# Patient Record
Sex: MALE | Race: WHITE | NOT HISPANIC OR LATINO
[De-identification: names, ages, dates, MRNs, and addresses within clinical notes are randomized per-mention and may not be internally consistent; named-entity substitution may affect disease eponyms.]

---

## 2017-05-21 ENCOUNTER — TRANSCRIPTION ENCOUNTER (OUTPATIENT)
Age: 62
End: 2017-05-21

## 2021-09-12 ENCOUNTER — TRANSCRIPTION ENCOUNTER (OUTPATIENT)
Age: 66
End: 2021-09-12

## 2022-03-01 ENCOUNTER — TRANSCRIPTION ENCOUNTER (OUTPATIENT)
Age: 67
End: 2022-03-01

## 2022-03-23 PROBLEM — Z00.00 ENCOUNTER FOR PREVENTIVE HEALTH EXAMINATION: Status: ACTIVE | Noted: 2022-03-23

## 2022-03-24 ENCOUNTER — NON-APPOINTMENT (OUTPATIENT)
Age: 67
End: 2022-03-24

## 2022-03-25 ENCOUNTER — APPOINTMENT (OUTPATIENT)
Dept: UROLOGY | Facility: CLINIC | Age: 67
End: 2022-03-25
Payer: COMMERCIAL

## 2022-03-25 ENCOUNTER — NON-APPOINTMENT (OUTPATIENT)
Age: 67
End: 2022-03-25

## 2022-03-25 VITALS
WEIGHT: 175 LBS | DIASTOLIC BLOOD PRESSURE: 85 MMHG | HEIGHT: 71 IN | TEMPERATURE: 97.3 F | BODY MASS INDEX: 24.5 KG/M2 | HEART RATE: 77 BPM | SYSTOLIC BLOOD PRESSURE: 122 MMHG

## 2022-03-25 DIAGNOSIS — N50.89 OTHER SPECIFIED DISORDERS OF THE MALE GENITAL ORGANS: ICD-10-CM

## 2022-03-25 LAB
BILIRUB UR QL STRIP: NORMAL
CLARITY UR: CLEAR
COLLECTION METHOD: NORMAL
GLUCOSE UR-MCNC: NORMAL
HCG UR QL: 0.2 EU/DL
HGB UR QL STRIP.AUTO: NORMAL
KETONES UR-MCNC: NORMAL
LEUKOCYTE ESTERASE UR QL STRIP: NORMAL
NITRITE UR QL STRIP: NORMAL
PH UR STRIP: 7
PROT UR STRIP-MCNC: NORMAL
SP GR UR STRIP: 1.01

## 2022-03-25 PROCEDURE — 81003 URINALYSIS AUTO W/O SCOPE: CPT | Mod: QW

## 2022-03-25 PROCEDURE — 99204 OFFICE O/P NEW MOD 45 MIN: CPT

## 2022-03-25 PROCEDURE — 76857 US EXAM PELVIC LIMITED: CPT

## 2022-03-25 NOTE — ASSESSMENT
[FreeTextEntry1] : We discussed the patient's marked lower urinary tract symptoms in the face of mild prostatic enlargement and a normal post void residual bladder volume.  We also discussed the trace white blood cells in the urinalysis.  I recommended and ordered a urine culture and cytology which was sent today and I recommended that he return in 2 weeks with a full bladder to perform uroflowmetry and PVR evaluation at the same time we will review the results of the tests done today to help determine the reason for his marked urinary symptoms he may require cystoscopy and urodynamics testing to completely elucidate the etiology of his marked lower urinary tract symptoms.\par \par As for the abnormal scrotal finding of the mass in the area of the left cauda epididymis, we will perform scrotal ultrasound in the office on his next visit to further elucidate whether this mass in any way impinges upon the testicle.  We will also evaluate the bilateral hydroceles with a scrotal ultrasound.  I reviewed the indications risks alternatives and chance for success with this approach and he is in agreement.  Sia Raza MD\par

## 2022-03-25 NOTE — PHYSICAL EXAM
[General Appearance - Well Developed] : well developed [General Appearance - Well Nourished] : well nourished [Normal Appearance] : normal appearance [Well Groomed] : well groomed [General Appearance - In No Acute Distress] : no acute distress [Edema] : no peripheral edema [Respiration, Rhythm And Depth] : normal respiratory rhythm and effort [Exaggerated Use Of Accessory Muscles For Inspiration] : no accessory muscle use [Abdomen Soft] : soft [Abdomen Tenderness] : non-tender [Costovertebral Angle Tenderness] : no ~M costovertebral angle tenderness [Urethral Meatus] : meatus normal [Penis Abnormality] : normal circumcised penis [Scrotum] : the scrotum was normal [Testes Tenderness] : no tenderness of the testes [Prostate Tenderness] : the prostate was not tender [No Prostate Nodules] : no prostate nodules [Prostate Size ___ gm] : prostate size [unfilled] gm [FreeTextEntry1] : Bilateral hydroceles L>R, 2 cm mass in the area of the left cauda epididymis which appears to be separate from but abutting the lower pole of the left testis and is non tender.  [Normal Station and Gait] : the gait and station were normal for the patient's age [No Focal Deficits] : no focal deficits [] : no rash [Oriented To Time, Place, And Person] : oriented to person, place, and time [Affect] : the affect was normal [Mood] : the mood was normal [Not Anxious] : not anxious

## 2022-03-25 NOTE — HISTORY OF PRESENT ILLNESS
[FreeTextEntry1] : 57 YO M seen TODAY 3/25/2022 as NPT for urinary issues. He was seen in 2005 ([facility]) for fertility issues.

## 2022-03-25 NOTE — HISTORY OF PRESENT ILLNESS
[FreeTextEntry1] : 57 YO M seen TODAY 3/25/2022 as NPT for urinary issues. He was seen in 2005 ([facility]) for fertility issues and told me that he now has a 17 YO son. He has noted progressive LUTS over the last 5 years which are severe at present. e has urgency, frequency, nocturia x 4 - 5, but no dysuria or gross hematuria. He was evaluated some years ago but without help. He has tried Flomax in the past but developed blurry vision and stopped this medication. \par UA trace WBC\par IPSS 35/35\par YEFRI 1/25\par VINAY 4/10\par PSA with PCP Eber 1.57 11/15/2021.\par Bladder US:\par PVR 34 cc\par Prostate 42 gm.\par \par Patient is on medication for HTN, cholesterol and gout. NKMA.    The patient denies fevers, chills, nausea and or vomiting and no unexplained weight loss. \par All pertinent parts of the patient PFSH (past medical, family and social histories), laboratory, radiological studies and physician notes were reviewed prior to starting the face to face portion of the  visit. Questionnaire results were discussed with patient.\par

## 2022-03-25 NOTE — LETTER BODY
[Dear  ___] : Dear ~PRIYANKA, [Consult Letter:] : I had the pleasure of evaluating your patient, [unfilled]. [Please see my note below.] : Please see my note below. [Consult Closing:] : Thank you very much for allowing me to participate in the care of this patient.  If you have any questions, please do not hesitate to contact me. [FreeTextEntry2] : Sang Velázquez MD [FreeTextEntry3] : Best personal regards,\par \par Sia\par

## 2022-03-30 LAB
BACTERIA UR CULT: NORMAL
URINE CYTOLOGY: NORMAL

## 2022-04-07 ENCOUNTER — APPOINTMENT (OUTPATIENT)
Dept: UROLOGY | Facility: CLINIC | Age: 67
End: 2022-04-07

## 2022-04-07 NOTE — HISTORY OF PRESENT ILLNESS
[FreeTextEntry1] : 55 YO M seen  3/25/2022 as NPT for urinary issues. He was seen in 2005 ([facility]) for fertility issues and told me that he now has a 15 YO son. He has noted progressive LUTS over the last 5 years which are severe at present. e has urgency, frequency, nocturia x 4 - 5, but no dysuria or gross hematuria. He was evaluated some years ago but without help. He has tried Flomax in the past but developed blurry vision and stopped this medication. \par UA trace WBC\par IPSS 35/35\par YEFRI 1/25\par VINAY 4/10\par PSA with PCP E[facility] 1.57 11/15/2021.\par Bladder US:\par PVR 34 cc\par Prostate 42 gm.\par We discussed the patient's marked lower urinary tract symptoms in the face of mild prostatic enlargement and a normal post void residual bladder volume. We also discussed the trace white blood cells in the urinalysis. I recommended and ordered a urine culture and cytology which was sent today and I recommended that he return in 2 weeks with a full bladder to perform uroflowmetry and PVR evaluation at the same time we will review the results of the tests done today to help determine the reason for his marked urinary symptoms he may require cystoscopy and urodynamics testing to completely elucidate the etiology of his marked lower urinary tract symptoms.\par As for the abnormal scrotal finding of the mass in the area of the left cauda epididymis, we will perform scrotal ultrasound in the office on his next visit to further elucidate whether this mass in any way impinges upon the testicle. We will also evaluate the bilateral hydroceles with a scrotal ultrasound. I reviewed the indications risks alternatives and chance for success with this approach and he is in agreement. Sia Raza MD\par C+S negative\par Cytology negative\par \par Patient seen TODAY 4/7/2022 for URoflow, PVR, and scrotal US. \par \par \par Patient is on medication for HTN, cholesterol and gout. NKMA.    The patient denies fevers, chills, nausea and or vomiting and no unexplained weight loss. \par All pertinent parts of the patient PFSH (past medical, family and social histories), laboratory, radiological studies and physician notes were reviewed prior to starting the face to face portion of the  visit. Questionnaire results were discussed with patient.\par

## 2022-04-12 ENCOUNTER — APPOINTMENT (OUTPATIENT)
Dept: UROLOGY | Facility: CLINIC | Age: 67
End: 2022-04-12
Payer: COMMERCIAL

## 2022-04-12 VITALS — SYSTOLIC BLOOD PRESSURE: 118 MMHG | DIASTOLIC BLOOD PRESSURE: 83 MMHG | TEMPERATURE: 97.3 F | HEART RATE: 67 BPM

## 2022-04-12 DIAGNOSIS — N43.3 HYDROCELE, UNSPECIFIED: ICD-10-CM

## 2022-04-12 LAB
BILIRUB UR QL STRIP: NORMAL
CLARITY UR: CLEAR
COLLECTION METHOD: NORMAL
GLUCOSE UR-MCNC: NORMAL
HCG UR QL: 0.2 EU/DL
HGB UR QL STRIP.AUTO: NORMAL
KETONES UR-MCNC: NORMAL
LEUKOCYTE ESTERASE UR QL STRIP: NORMAL
NITRITE UR QL STRIP: NORMAL
PH UR STRIP: 6
PROT UR STRIP-MCNC: NORMAL
SP GR UR STRIP: 1.01

## 2022-04-12 PROCEDURE — 76870 US EXAM SCROTUM: CPT

## 2022-04-12 PROCEDURE — 81003 URINALYSIS AUTO W/O SCOPE: CPT | Mod: QW

## 2022-04-12 PROCEDURE — 99213 OFFICE O/P EST LOW 20 MIN: CPT

## 2022-04-12 PROCEDURE — 51741 ELECTRO-UROFLOWMETRY FIRST: CPT

## 2022-04-12 NOTE — LETTER BODY
[Dear  ___] : Dear ~PRIYANKA, [Courtesy Letter:] : I had the pleasure of seeing your patient, [unfilled], in my office today. [Please see my note below.] : Please see my note below. [Consult Closing:] : Thank you very much for allowing me to participate in the care of this patient.  If you have any questions, please do not hesitate to contact me. [FreeTextEntry2] : Sang Velázquez [FreeTextEntry3] : Best personal regards,\par \par Sia\par

## 2022-04-12 NOTE — ASSESSMENT
[FreeTextEntry1] : We discussed the patient's mild and intermittent lower urinary tract symptoms in the face of normal uroflowmetry normal PVR negative urine culture and cytology.  I discussed empiric therapy with another alpha blocking agent and we will try him alfuzosin 10 mg daily I ordered this for 6 months and asked him to follow-up with me in 3 months for a repeat uroflowmetry and PVR determination.  I reviewed the use of alfuzosin along with the indications risks alternatives and chances for success.  We also reviewed the potential side effect profile which is similar but less severe than with tamsulosin.  Patient is in agreement with this approach and will try the medicine\par \par As for his scrotal findings of bilateral small spermatoceles and hydroceles, no intervention is necessary at this time we will continue to follow and repeat ultrasound in the future if any change occurs. Sia Raza MD\par

## 2022-04-12 NOTE — HISTORY OF PRESENT ILLNESS
[FreeTextEntry1] : 65 YO M seen  3/25/2022 as NPT for urinary issues. He was seen in 2005 ([facility]) for fertility issues and told me that he now has a 17 YO son. He has noted progressive LUTS over the last 5 years which are severe at present. e has urgency, frequency, nocturia x 4 - 5, but no dysuria or gross hematuria. He was evaluated some years ago but without help. He has tried Flomax in the past but developed blurry vision and stopped this medication. \par UA trace WBC\par IPSS 35/35\par YEFRI 1/25\par VINAY 4/10\par PSA with PCP E[facility] 1.57 11/15/2021.\par Bladder US:\par PVR 34 cc\par Prostate 42 gm.\par We discussed the patient's marked lower urinary tract symptoms in the face of mild prostatic enlargement and a normal post void residual bladder volume. We also discussed the trace white blood cells in the urinalysis. I recommended and ordered a urine culture and cytology which was sent today and I recommended that he return in 2 weeks with a full bladder to perform uroflowmetry and PVR evaluation at the same time we will review the results of the tests done today to help determine the reason for his marked urinary symptoms he may require cystoscopy and urodynamics testing to completely elucidate the etiology of his marked lower urinary tract symptoms.\par As for the abnormal scrotal finding of the mass in the area of the left cauda epididymis, we will perform scrotal ultrasound in the office on his next visit to further elucidate whether this mass in any way impinges upon the testicle. We will also evaluate the bilateral hydroceles with a scrotal ultrasound. I reviewed the indications risks alternatives and chance for success with this approach and he is in agreement. Sia Raza MD\par C+S negative\par Cytology negative\par \par Patient seen TODAY 4/12/2022 for URoflow, PVR, and scrotal US. He told me that he continues to have intermittent LUTS with hesitancy and a weak stream. He tried Flomax in the past but had side effects. He is interested in tryig another medication.\par UA negative\par Uroflow:\par Vol 147 cc\par V Max 13.6 cc/s\par V Ave 7.3 cc/s\par \par Scrotal US:Impression: \par 1.Bilateral small epididymal head cyst visualized.\par 2.Bilateral simple hydrocele visualized measuring 2.9 x 1.9 cm in Right side and 4.2 x 2.2 cm in left side.\par 3.Normal vascular supply in both testicle.\par \par \par \par Patient is on medication for HTN, cholesterol and gout. NKMA.    The patient denies fevers, chills, nausea and or vomiting and no unexplained weight loss. \par All pertinent parts of the patient PFSH (past medical, family and social histories), laboratory, radiological studies and physician notes were reviewed prior to starting the face to face portion of the  visit. Questionnaire results were discussed with patient.\par

## 2022-04-18 RX ORDER — ALFUZOSIN HYDROCHLORIDE 10 MG/1
10 TABLET, EXTENDED RELEASE ORAL DAILY
Qty: 30 | Refills: 0 | Status: ACTIVE | COMMUNITY
Start: 2022-04-15 | End: 1900-01-01

## 2022-07-21 ENCOUNTER — APPOINTMENT (OUTPATIENT)
Dept: UROLOGY | Facility: CLINIC | Age: 67
End: 2022-07-21

## 2022-07-21 VITALS
BODY MASS INDEX: 24.5 KG/M2 | TEMPERATURE: 98 F | DIASTOLIC BLOOD PRESSURE: 72 MMHG | WEIGHT: 175 LBS | HEIGHT: 71 IN | HEART RATE: 67 BPM | RESPIRATION RATE: 18 BRPM | SYSTOLIC BLOOD PRESSURE: 105 MMHG

## 2022-07-21 PROCEDURE — 99213 OFFICE O/P EST LOW 20 MIN: CPT

## 2022-07-21 PROCEDURE — 81003 URINALYSIS AUTO W/O SCOPE: CPT | Mod: QW

## 2022-07-21 PROCEDURE — 51798 US URINE CAPACITY MEASURE: CPT

## 2022-07-21 NOTE — ASSESSMENT
[FreeTextEntry1] : We discussed the patient's urinary symptoms in the face of a minimal residual bladder volume and I recommended full urodynamics testing in order to further delineate the etiology of the symptoms.  As to the terminal dribbling and mild incontinence, he may also require cystoscopy after the urodynamics testing.  I reviewed the indications risks alternatives and chance for success with this approach and the patient is amenable to proceeding.  Urodynamics was scheduled for him.\par \par As for the alfuzosin, it does help his symptoms I recommend he continue on the medication.  This was renewed for him for a year. Sia Raza MD

## 2022-07-21 NOTE — LETTER BODY
[Dear  ___] : Dear ~PRIYANKA, [Courtesy Letter:] : I had the pleasure of seeing your patient, [unfilled], in my office today. [Please see my note below.] : Please see my note below. [Consult Closing:] : Thank you very much for allowing me to participate in the care of this patient.  If you have any questions, please do not hesitate to contact me. [FreeTextEntry2] : Sang Velázquez MD [FreeTextEntry3] : Best personal regards,\par \par Sia\par

## 2022-07-21 NOTE — HISTORY OF PRESENT ILLNESS
[FreeTextEntry1] : 65 YO M seen  3/25/2022 as NPT for urinary issues. He was seen in 2005 ([facility]) for fertility issues and told me that he now has a 15 YO son. He has noted progressive LUTS over the last 5 years which are severe at present. e has urgency, frequency, nocturia x 4 - 5, but no dysuria or gross hematuria. He was evaluated some years ago but without help. He has tried Flomax in the past but developed blurry vision and stopped this medication. \par UA trace WBC\par IPSS 35/35\par YEFRI 1/25\par VINAY 4/10\par PSA with PCP E[facility] 1.57 11/15/2021.\par Bladder US:\par PVR 34 cc\par Prostate 42 gm.\par We discussed the patient's marked lower urinary tract symptoms in the face of mild prostatic enlargement and a normal post void residual bladder volume. We also discussed the trace white blood cells in the urinalysis. I recommended and ordered a urine culture and cytology which was sent today and I recommended that he return in 2 weeks with a full bladder to perform uroflowmetry and PVR evaluation at the same time we will review the results of the tests done today to help determine the reason for his marked urinary symptoms he may require cystoscopy and urodynamics testing to completely elucidate the etiology of his marked lower urinary tract symptoms.\par As for the abnormal scrotal finding of the mass in the area of the left cauda epididymis, we will perform scrotal ultrasound in the office on his next visit to further elucidate whether this mass in any way impinges upon the testicle. We will also evaluate the bilateral hydroceles with a scrotal ultrasound. I reviewed the indications risks alternatives and chance for success with this approach and he is in agreement. Sia Raza MD\par C+S negative\par Cytology negative\par \par Patient seen  4/12/2022 for URoflow, PVR, and scrotal US. He told me that he continues to have intermittent LUTS with hesitancy and a weak stream. He tried Flomax in the past but had side effects. He is interested in tryig another medication.\par UA negative\par Uroflow:\par Vol 147 cc\par V Max 13.6 cc/s\par V Ave 7.3 cc/s\par \par Scrotal US:Impression: \par 1.Bilateral small epididymal head cyst visualized.\par 2.Bilateral simple hydrocele visualized measuring 2.9 x 1.9 cm in Right side and 4.2 x 2.2 cm in left side.\par 3.Normal vascular supply in both testicle.\par We discussed the patient's mild and intermittent lower urinary tract symptoms in the face of normal uroflowmetry normal PVR negative urine culture and cytology. I discussed empiric therapy with another alpha blocking agent and we will try him alfuzosin 10 mg daily I ordered this for 6 months and asked him to follow-up with me in 3 months for a repeat uroflowmetry and PVR determination. I reviewed the use of alfuzosin along with the indications risks alternatives and chances for success. We also reviewed the potential side effect profile which is similar but less severe than with tamsulosin. Patient is in agreement with this approach and will try the medicine\par As for his scrotal findings of bilateral small spermatoceles and hydroceles, no intervention is necessary at this time we will continue to follow and repeat ultrasound in the future if any change occurs.\par \par Patient seen TODAY 7/21/2022 to assess his response to the alfuzosin with repeat Uroflow and PVR. He told me that while his urination is a little better on the alfuzosin, he does note that if he misses a dose, his symptoms are significantly worse, and he requests a refill. He also notes the common loss of urine from the penis after urination, typically unconsciously, which causes him to wet his underwear, and is a significant annoyance. He can usually sleep through the night until about 5 AM, then urinates 2 - 3 times over the next 1 -2  hours. \par UA negative\par IPSS 24\par URoflow inaccurate due to 45 cc Volume\par PV 2 cc. \par \par Patient is on medication for HTN, cholesterol and gout. NKMA.    The patient denies fevers, chills, nausea and or vomiting and no unexplained weight loss. \par All pertinent parts of the patient PFSH (past medical, family and social histories), laboratory, radiological studies and physician notes were reviewed prior to starting the face to face portion of the  visit. Questionnaire results were discussed with patient.\par

## 2022-08-11 ENCOUNTER — APPOINTMENT (OUTPATIENT)
Dept: UROLOGY | Facility: CLINIC | Age: 67
End: 2022-08-11

## 2022-08-25 ENCOUNTER — APPOINTMENT (OUTPATIENT)
Dept: UROLOGY | Facility: CLINIC | Age: 67
End: 2022-08-25

## 2022-08-25 VITALS
HEART RATE: 73 BPM | OXYGEN SATURATION: 98 % | TEMPERATURE: 98 F | WEIGHT: 175 LBS | DIASTOLIC BLOOD PRESSURE: 83 MMHG | HEIGHT: 71 IN | SYSTOLIC BLOOD PRESSURE: 118 MMHG | BODY MASS INDEX: 24.5 KG/M2 | RESPIRATION RATE: 18 BRPM

## 2022-08-25 LAB
BILIRUB UR QL STRIP: NORMAL
CLARITY UR: CLEAR
COLLECTION METHOD: NORMAL
GLUCOSE UR-MCNC: NORMAL
HCG UR QL: 0.2 EU/DL
HGB UR QL STRIP.AUTO: NORMAL
KETONES UR-MCNC: NORMAL
LEUKOCYTE ESTERASE UR QL STRIP: NORMAL
NITRITE UR QL STRIP: NORMAL
PH UR STRIP: 6
PROT UR STRIP-MCNC: NORMAL
SP GR UR STRIP: 1.02

## 2022-08-25 PROCEDURE — 51728 CYSTOMETROGRAM W/VP: CPT

## 2022-08-25 PROCEDURE — 51798 US URINE CAPACITY MEASURE: CPT

## 2022-08-25 PROCEDURE — 51784 ANAL/URINARY MUSCLE STUDY: CPT

## 2022-08-25 PROCEDURE — 51741 ELECTRO-UROFLOWMETRY FIRST: CPT

## 2022-08-25 PROCEDURE — 81003 URINALYSIS AUTO W/O SCOPE: CPT | Mod: QW

## 2022-08-25 PROCEDURE — 51797 INTRAABDOMINAL PRESSURE TEST: CPT

## 2022-08-25 NOTE — HISTORY OF PRESENT ILLNESS
[FreeTextEntry1] : 65 YO M seen  3/25/2022 as NPT for urinary issues. He was seen in 2005 ([facility]) for fertility issues and told me that he now has a 15 YO son. He has noted progressive LUTS over the last 5 years which are severe at present. e has urgency, frequency, nocturia x 4 - 5, but no dysuria or gross hematuria. He was evaluated some years ago but without help. He has tried Flomax in the past but developed blurry vision and stopped this medication. \par UA trace WBC\par IPSS 35/35\par YEFRI 1/25\par VINAY 4/10\par PSA with PCP E[facility] 1.57 11/15/2021.\par Bladder US:\par PVR 34 cc\par Prostate 42 gm.\par We discussed the patient's marked lower urinary tract symptoms in the face of mild prostatic enlargement and a normal post void residual bladder volume. We also discussed the trace white blood cells in the urinalysis. I recommended and ordered a urine culture and cytology which was sent today and I recommended that he return in 2 weeks with a full bladder to perform uroflowmetry and PVR evaluation at the same time we will review the results of the tests done today to help determine the reason for his marked urinary symptoms he may require cystoscopy and urodynamics testing to completely elucidate the etiology of his marked lower urinary tract symptoms.\par As for the abnormal scrotal finding of the mass in the area of the left cauda epididymis, we will perform scrotal ultrasound in the office on his next visit to further elucidate whether this mass in any way impinges upon the testicle. We will also evaluate the bilateral hydroceles with a scrotal ultrasound. I reviewed the indications risks alternatives and chance for success with this approach and he is in agreement. Sia Raza MD\par C+S negative\par Cytology negative\par \par Patient seen  4/12/2022 for URoflow, PVR, and scrotal US. He told me that he continues to have intermittent LUTS with hesitancy and a weak stream. He tried Flomax in the past but had side effects. He is interested in tryig another medication.\par UA negative\par Uroflow:\par Vol 147 cc\par V Max 13.6 cc/s\par V Ave 7.3 cc/s\par \par Scrotal US:Impression: \par 1.Bilateral small epididymal head cyst visualized.\par 2.Bilateral simple hydrocele visualized measuring 2.9 x 1.9 cm in Right side and 4.2 x 2.2 cm in left side.\par 3.Normal vascular supply in both testicle.\par We discussed the patient's mild and intermittent lower urinary tract symptoms in the face of normal uroflowmetry normal PVR negative urine culture and cytology. I discussed empiric therapy with another alpha blocking agent and we will try him alfuzosin 10 mg daily I ordered this for 6 months and asked him to follow-up with me in 3 months for a repeat uroflowmetry and PVR determination. I reviewed the use of alfuzosin along with the indications risks alternatives and chances for success. We also reviewed the potential side effect profile which is similar but less severe than with tamsulosin. Patient is in agreement with this approach and will try the medicine\par As for his scrotal findings of bilateral small spermatoceles and hydroceles, no intervention is necessary at this time we will continue to follow and repeat ultrasound in the future if any change occurs.\par \par Patient seen  7/21/2022 to assess his response to the alfuzosin with repeat Uroflow and PVR. He told me that while his urination is a little better on the alfuzosin, he does note that if he misses a dose, his symptoms are significantly worse, and he requests a refill. He also notes the common loss of urine from the penis after urination, typically unconsciously, which causes him to wet his underwear, and is a significant annoyance. He can usually sleep through the night until about 5 AM, then urinates 2 - 3 times over the next 1 -2  hours. \par UA negative\par IPSS 24\par URoflow inaccurate due to 45 cc Volume\par PV 2 cc. \par We discussed the patient's urinary symptoms in the face of a minimal residual bladder volume and I recommended full urodynamics testing in order to further delineate the etiology of the symptoms. As to the terminal dribbling and mild incontinence, he may also require cystoscopy after the urodynamics testing. I reviewed the indications risks alternatives and chance for success with this approach and the patient is amenable to proceeding. Urodynamics was scheduled for him.\par As for the alfuzosin, it does help his symptoms I recommend he continue on the medication. This was renewed for him for a year. \par \par Patient seen TODAY 8/25/2022 for Urodynamics evaluation. He continues to have intermittent difficulty urinating with a weak stream.\par Urodynamics testing today showed good bladder capacity with normal detrusor activity but CARRASCO with the urethral catheter in place. Uroflow performed prior to and after the bladder filling phase showed better flow rate but likely due to abdominal pressure. \par Total bladder filling 250 ml\par Voided for Flow    190 ml\par Calculated PVR       60 ml.\par  ml\par  ml\par  ml\par Capacity 250ml\par P MAx 122 cm H2O\par \par Patient is on medication for HTN, cholesterol and gout. NKMA.    The patient denies fevers, chills, nausea and or vomiting and no unexplained weight loss. \par All pertinent parts of the patient PFSH (past medical, family and social histories), laboratory, radiological studies and physician notes were reviewed prior to starting the face to face portion of the  visit. Questionnaire results were discussed with patient.\par

## 2022-08-25 NOTE — LETTER BODY
[Dear  ___] : Dear ~PRIYANKA, [Courtesy Letter:] : I had the pleasure of seeing your patient, [unfilled], in my office today. [Please see my note below.] : Please see my note below. [Consult Closing:] : Thank you very much for allowing me to participate in the care of this patient.  If you have any questions, please do not hesitate to contact me. [FreeTextEntry2] : Sang Velázquez MD [FreeTextEntry3] : Best Regards, \par \par Sia Raza MD\par

## 2022-08-25 NOTE — ASSESSMENT
[FreeTextEntry1] : Studies today indicate normal bladder function with bladder outlet obstructive symptoms.  These may be due to foreign body in the bladder such as calculus, enlargement of the prostate gland due to BPH, urethral stricture.  The last may be more likely in view of the difficulty urinating with the small urodynamics catheter compared to with no catheter at all.\par \par I discussed these findings with Mr. Young and offered to approaches moving forward.  #1 if his symptoms are tolerable, then we can continue to observe on the alfuzosin and reassess in 3 months.  #2 further evaluate the bladder outflow tract with cystoscopy in the office under local anesthesia supplemented by nitrous oxide inhalation.  I reviewed the risks alternatives chances for success and indications for each of these approaches.  We will schedule a follow-up for 3 months while he chooses between the options.  Patient given 1 dose of Bactrim and Pyridium each at the end of the procedure. Sia Raza MD\par

## 2022-11-22 ENCOUNTER — NON-APPOINTMENT (OUTPATIENT)
Age: 67
End: 2022-11-22

## 2023-01-17 ENCOUNTER — RX RENEWAL (OUTPATIENT)
Age: 68
End: 2023-01-17

## 2023-02-02 ENCOUNTER — APPOINTMENT (OUTPATIENT)
Dept: UROLOGY | Facility: CLINIC | Age: 68
End: 2023-02-02
Payer: MEDICARE

## 2023-02-02 PROCEDURE — 99214 OFFICE O/P EST MOD 30 MIN: CPT

## 2023-02-02 NOTE — HISTORY OF PRESENT ILLNESS
[FreeTextEntry1] : 68 YO M seen  3/25/2022 as NPT for urinary issues. He was seen in 2005 ([facility]) for fertility issues and told me that he now has a 15 YO son. He has noted progressive LUTS over the last 5 years which are severe at present. e has urgency, frequency, nocturia x 4 - 5, but no dysuria or gross hematuria. He was evaluated some years ago but without help. He has tried Flomax in the past but developed blurry vision and stopped this medication. \par UA trace WBC\par IPSS 35/35\par YEFRI 1/25\par VINAY 4/10\par PSA with PCP Eber 1.57 11/15/2021.\par Bladder US:\par PVR 34 cc\par Prostate 42 gm.\par We discussed the patient's marked lower urinary tract symptoms in the face of mild prostatic enlargement and a normal post void residual bladder volume. We also discussed the trace white blood cells in the urinalysis. I recommended and ordered a urine culture and cytology which was sent today and I recommended that he return in 2 weeks with a full bladder to perform uroflowmetry and PVR evaluation at the same time we will review the results of the tests done today to help determine the reason for his marked urinary symptoms he may require cystoscopy and urodynamics testing to completely elucidate the etiology of his marked lower urinary tract symptoms.\par As for the abnormal scrotal finding of the mass in the area of the left cauda epididymis, we will perform scrotal ultrasound in the office on his next visit to further elucidate whether this mass in any way impinges upon the testicle. We will also evaluate the bilateral hydroceles with a scrotal ultrasound. I reviewed the indications risks alternatives and chance for success with this approach and he is in agreement. Sia Raza MD\par C+S negative\par Cytology negative\par \par Patient seen  4/12/2022 for URoflow, PVR, and scrotal US. He told me that he continues to have intermittent LUTS with hesitancy and a weak stream. He tried Flomax in the past but had side effects. He is interested in tryig another medication.\par UA negative\par Uroflow:\par Vol 147 cc\par V Max 13.6 cc/s\par V Ave 7.3 cc/s\par \par Scrotal US:Impression: \par 1.Bilateral small epididymal head cyst visualized.\par 2.Bilateral simple hydrocele visualized measuring 2.9 x 1.9 cm in Right side and 4.2 x 2.2 cm in left side.\par 3.Normal vascular supply in both testicle.\par We discussed the patient's mild and intermittent lower urinary tract symptoms in the face of normal uroflowmetry normal PVR negative urine culture and cytology. I discussed empiric therapy with another alpha blocking agent and we will try him alfuzosin 10 mg daily I ordered this for 6 months and asked him to follow-up with me in 3 months for a repeat uroflowmetry and PVR determination. I reviewed the use of alfuzosin along with the indications risks alternatives and chances for success. We also reviewed the potential side effect profile which is similar but less severe than with tamsulosin. Patient is in agreement with this approach and will try the medicine\par As for his scrotal findings of bilateral small spermatoceles and hydroceles, no intervention is necessary at this time we will continue to follow and repeat ultrasound in the future if any change occurs.\par \par Patient seen  7/21/2022 to assess his response to the alfuzosin with repeat Uroflow and PVR. He told me that while his urination is a little better on the alfuzosin, he does note that if he misses a dose, his symptoms are significantly worse, and he requests a refill. He also notes the common loss of urine from the penis after urination, typically unconsciously, which causes him to wet his underwear, and is a significant annoyance. He can usually sleep through the night until about 5 AM, then urinates 2 - 3 times over the next 1 -2  hours. \par UA negative\par IPSS 24\par URoflow inaccurate due to 45 cc Volume\par PV 2 cc. \par We discussed the patient's urinary symptoms in the face of a minimal residual bladder volume and I recommended full urodynamics testing in order to further delineate the etiology of the symptoms. As to the terminal dribbling and mild incontinence, he may also require cystoscopy after the urodynamics testing. I reviewed the indications risks alternatives and chance for success with this approach and the patient is amenable to proceeding. Urodynamics was scheduled for him.\par As for the alfuzosin, it does help his symptoms I recommend he continue on the medication. This was renewed for him for a year. \par \par Patient seen 8/25/2022 for Urodynamics evaluation. He continues to have intermittent difficulty urinating with a weak stream.\par Urodynamics testing today showed good bladder capacity with normal detrusor activity but CARRASCO with the urethral catheter in place. Uroflow performed prior to and after the bladder filling phase showed better flow rate but likely due to abdominal pressure. \par Total bladder filling 250 ml\par Voided for Flow    190 ml\par Calculated PVR       60 ml.\par  ml\par  ml\par  ml\par Capacity 250ml\par P MAx 122 cm H2O\par \par Studies today indicate normal bladder function with bladder outlet obstructive symptoms.  These may be due to foreign body in the bladder such as calculus, enlargement of the prostate gland due to BPH, urethral stricture.  The last may be more likely in view of the difficulty urinating with the small urodynamics catheter compared to with no catheter at all.\par \par I discussed these findings with Mr. Young and offered to approaches moving forward.  #1 if his symptoms are tolerable, then we can continue to observe on the alfuzosin and reassess in 3 months.  #2 further evaluate the bladder outflow tract with cystoscopy in the office under local anesthesia supplemented by nitrous oxide inhalation.  I reviewed the risks alternatives chances for success and indications for each of these approaches.  We will schedule a follow-up for 3 months while he chooses between the options.  Patient given 1 dose of Bactrim and Pyridium each at the end of the procedure.\par \par Patient seen TODAY 2/2/2023 to reassess BPH and LUTS and discuss management. He had difficulty with the urodynamics test and has not yet had the cystoscopy. He finds that his symptoms are increasingly bothersome. He has questions about URoLift procedure. \par \par UA Unable to urinate today.\par IPSS 29\par YEFRI 1\par VINAY 2\par \par \par Patient is on medication for HTN, cholesterol and gout. NKMA.    The patient denies fevers, chills, nausea and or vomiting and no unexplained weight loss. \par All pertinent parts of the patient PFSH (past medical, family and social histories), laboratory, radiological studies and physician notes were reviewed prior to starting the face to face portion of the  visit. Questionnaire results were discussed with patient.\par

## 2023-02-02 NOTE — LETTER BODY
[Dear  ___] : Dear  [unfilled], [Courtesy Letter:] : I had the pleasure of seeing your patient, [unfilled], in my office today. [Please see my note below.] : Please see my note below. [Consult Closing:] : Thank you very much for allowing me to participate in the care of this patient.  If you have any questions, please do not hesitate to contact me. [FreeTextEntry3] : Best Regards, \par \par Sia Raza MD\par

## 2023-02-02 NOTE — ASSESSMENT
[FreeTextEntry1] : We discussed the findings from his urodynamics testing that were consistent with CARRASCO and also discussed the Urolift along with other minimally invasive techniwues, such as Rezum. We also discussed more invasive techniques to remove prostate tissue such as TURP, HOLEP. I recommended that he undergo a cystoscopy to complete his evaluation for obstructive uropathy. \par \par Since he is interested in minimally invasive procedures, I recommended that he be further evaluated by my partner Gideon with the cystoscopy and then have Gideon continue with prostate reductive therapy as indicated. Patient is in agreement with this approach, appointment made. Sia Raza MD\par  Never smoker

## 2023-02-15 ENCOUNTER — APPOINTMENT (OUTPATIENT)
Dept: UROLOGY | Facility: CLINIC | Age: 68
End: 2023-02-15
Payer: MEDICARE

## 2023-02-15 VITALS
SYSTOLIC BLOOD PRESSURE: 129 MMHG | OXYGEN SATURATION: 96 % | HEART RATE: 64 BPM | TEMPERATURE: 97.7 F | DIASTOLIC BLOOD PRESSURE: 84 MMHG

## 2023-02-15 DIAGNOSIS — Z87.891 PERSONAL HISTORY OF NICOTINE DEPENDENCE: ICD-10-CM

## 2023-02-15 DIAGNOSIS — I10 ESSENTIAL (PRIMARY) HYPERTENSION: ICD-10-CM

## 2023-02-15 DIAGNOSIS — M10.9 GOUT, UNSPECIFIED: ICD-10-CM

## 2023-02-15 PROCEDURE — 99214 OFFICE O/P EST MOD 30 MIN: CPT | Mod: 25

## 2023-02-15 PROCEDURE — 51798 US URINE CAPACITY MEASURE: CPT

## 2023-02-15 PROCEDURE — 81003 URINALYSIS AUTO W/O SCOPE: CPT | Mod: QW

## 2023-02-15 RX ORDER — ATENOLOL 100 MG/1
100 TABLET ORAL
Refills: 0 | Status: ACTIVE | COMMUNITY

## 2023-02-15 RX ORDER — AMLODIPINE BESYLATE 10 MG/1
10 TABLET ORAL
Refills: 0 | Status: ACTIVE | COMMUNITY

## 2023-02-15 RX ORDER — ATORVASTATIN CALCIUM 10 MG/1
10 TABLET, FILM COATED ORAL
Refills: 0 | Status: ACTIVE | COMMUNITY

## 2023-02-15 RX ORDER — ALLOPURINOL 100 MG/1
100 TABLET ORAL
Refills: 0 | Status: ACTIVE | COMMUNITY

## 2023-02-15 NOTE — HISTORY OF PRESENT ILLNESS
[FreeTextEntry1] : 67 yr old male, referred by Dr. Raza, to discuss prostate debulking procedures. He has had bothersome LUTS for many years. He is currently on alfuzosin. He urinates with urgency, frequency, slow intermittent stream, post-void dribbling, nocturia x3-4. No history of gross hematuria, hx retention, bladder stones, frequent UTIs. \par \par He has had UDS in the past which showed normal bladder function with CARRASCO. \par \par Of note, he will be having hernia surgery in the near future. \par \par Bladder US: prostate 42 grams \par \par PSAs: 11/15/2021--1.57; 11/22/22--1.5; \par \par IPSS: 29, QOL: 4  \par UA: neg\par PVR: 51 ml

## 2023-02-15 NOTE — ASSESSMENT
[FreeTextEntry1] : 67 yr old male with BPH and bothersome LUTS on alfuzosin presents to discuss minimally invasive options for prostatic reductive surgery. \par \par Patient elects to proceed with Rezum in OR. He is schedule for hernia surgery on 3/9/23 and will be obtain pre-op clearance from his PCP for that, as long as surgery is within 30 days from clearance, we can use that. \par \par -f/u ucx \par - preop clearance with PCP\par - He will call the office to schedule Rezum procedure

## 2023-02-15 NOTE — LETTER BODY
[Dear  ___] : Dear  [unfilled], [Courtesy Letter:] : I had the pleasure of seeing your patient, [unfilled], in my office today. [Please see my note below.] : Please see my note below. [Consult Closing:] : Thank you very much for allowing me to participate in the care of this patient.  If you have any questions, please do not hesitate to contact me. [Sincerely,] : Sincerely, [FreeTextEntry3] : Sofiya Meneses MD

## 2023-02-21 DIAGNOSIS — N39.0 URINARY TRACT INFECTION, SITE NOT SPECIFIED: ICD-10-CM

## 2023-02-21 LAB — BACTERIA UR CULT: ABNORMAL

## 2023-02-21 RX ORDER — AMOXICILLIN AND CLAVULANATE POTASSIUM 875; 125 MG/1; MG/1
875-125 TABLET, COATED ORAL
Qty: 10 | Refills: 0 | Status: ACTIVE | COMMUNITY
Start: 2023-02-21 | End: 1900-01-01

## 2023-03-05 ENCOUNTER — NON-APPOINTMENT (OUTPATIENT)
Age: 68
End: 2023-03-05

## 2023-03-07 ENCOUNTER — NON-APPOINTMENT (OUTPATIENT)
Age: 68
End: 2023-03-07

## 2023-03-08 ENCOUNTER — TRANSCRIPTION ENCOUNTER (OUTPATIENT)
Age: 68
End: 2023-03-08

## 2023-03-08 NOTE — ASU PATIENT PROFILE, ADULT - ABLE TO REACH PT
Left voicemail instruction and time. Patient instructed to arrive at 05:30am. No solid food/dairy/candy/gum after 9:30pm tonight, water allowed before 04:30am tomorrow; Patient reminded to bring photo ID/insurance card; dress in comfortable clothes; no jewelries/contact lens/no valuable; no smoking/alcohol drinking/recreational drug use today; escort to come with photo ID; address and callback number was given./no Left voicemail instruction and time. Patient instructed to arrive at 05:30am. No solid food/dairy/candy/gum after 9:30pm tonight, water allowed before 04:30am tomorrow; Patient reminded to bring photo ID/insurance card; dress in comfortable clothes; no jewelries/contact lens/no valuable; no smoking/alcohol drinking/recreational drug use today; escort to come with photo ID; address and callback number was given. MD's office was made aware, spoke to Joycelyn/ana

## 2023-03-08 NOTE — ASU PATIENT PROFILE, ADULT - FALL HARM RISK - UNIVERSAL INTERVENTIONS
Bed in lowest position, wheels locked, appropriate side rails in place/Call bell, personal items and telephone in reach/Instruct patient to call for assistance before getting out of bed or chair/Non-slip footwear when patient is out of bed/[place] to call system/Physically safe environment - no spills, clutter or unnecessary equipment/Purposeful Proactive Rounding/Room/bathroom lighting operational, light cord in reach

## 2023-03-09 ENCOUNTER — OUTPATIENT (OUTPATIENT)
Dept: OUTPATIENT SERVICES | Facility: HOSPITAL | Age: 68
LOS: 1 days | Discharge: HOME | End: 2023-03-09
Payer: MEDICARE

## 2023-03-09 ENCOUNTER — TRANSCRIPTION ENCOUNTER (OUTPATIENT)
Age: 68
End: 2023-03-09

## 2023-03-09 VITALS
TEMPERATURE: 98 F | SYSTOLIC BLOOD PRESSURE: 135 MMHG | HEART RATE: 63 BPM | OXYGEN SATURATION: 98 % | RESPIRATION RATE: 16 BRPM | DIASTOLIC BLOOD PRESSURE: 86 MMHG

## 2023-03-09 VITALS
RESPIRATION RATE: 16 BRPM | HEART RATE: 64 BPM | TEMPERATURE: 98 F | SYSTOLIC BLOOD PRESSURE: 119 MMHG | HEIGHT: 71 IN | OXYGEN SATURATION: 98 % | WEIGHT: 170.64 LBS | DIASTOLIC BLOOD PRESSURE: 86 MMHG

## 2023-03-09 PROCEDURE — 49650 LAP ING HERNIA REPAIR INIT: CPT | Mod: AS

## 2023-03-09 DEVICE — MESH HERNIA INGUINAL PROGRIP LAPAROSCOPIC 15 X 10CM LEFT: Type: IMPLANTABLE DEVICE | Site: LEFT | Status: FUNCTIONAL

## 2023-03-09 RX ORDER — SODIUM CHLORIDE 9 MG/ML
1000 INJECTION, SOLUTION INTRAVENOUS
Refills: 0 | Status: DISCONTINUED | OUTPATIENT
Start: 2023-03-09 | End: 2023-03-09

## 2023-03-09 RX ORDER — ONDANSETRON 8 MG/1
4 TABLET, FILM COATED ORAL ONCE
Refills: 0 | Status: DISCONTINUED | OUTPATIENT
Start: 2023-03-09 | End: 2023-03-09

## 2023-03-09 RX ORDER — ACETAMINOPHEN 500 MG
1000 TABLET ORAL ONCE
Refills: 0 | Status: COMPLETED | OUTPATIENT
Start: 2023-03-09 | End: 2023-03-09

## 2023-03-09 RX ORDER — CHLORHEXIDINE GLUCONATE 213 G/1000ML
1 SOLUTION TOPICAL ONCE
Refills: 0 | Status: COMPLETED | OUTPATIENT
Start: 2023-03-09 | End: 2023-03-09

## 2023-03-09 RX ORDER — FENTANYL CITRATE 50 UG/ML
25 INJECTION INTRAVENOUS
Refills: 0 | Status: DISCONTINUED | OUTPATIENT
Start: 2023-03-09 | End: 2023-03-09

## 2023-03-09 RX ORDER — HYDROMORPHONE HYDROCHLORIDE 2 MG/ML
0.5 INJECTION INTRAMUSCULAR; INTRAVENOUS; SUBCUTANEOUS
Refills: 0 | Status: COMPLETED | OUTPATIENT
Start: 2023-03-09 | End: 2023-03-09

## 2023-03-09 RX ORDER — APREPITANT 80 MG/1
40 CAPSULE ORAL ONCE
Refills: 0 | Status: COMPLETED | OUTPATIENT
Start: 2023-03-09 | End: 2023-03-09

## 2023-03-09 RX ADMIN — HYDROMORPHONE HYDROCHLORIDE 0.5 MILLIGRAM(S): 2 INJECTION INTRAMUSCULAR; INTRAVENOUS; SUBCUTANEOUS at 10:10

## 2023-03-09 RX ADMIN — APREPITANT 40 MILLIGRAM(S): 80 CAPSULE ORAL at 07:04

## 2023-03-09 RX ADMIN — Medication 1000 MILLIGRAM(S): at 07:04

## 2023-03-09 RX ADMIN — CHLORHEXIDINE GLUCONATE 1 APPLICATION(S): 213 SOLUTION TOPICAL at 07:04

## 2023-03-09 NOTE — ASU DISCHARGE PLAN (ADULT/PEDIATRIC) - CARE PROVIDER_API CALL
Khurram Mae)  Surgery  [address]  [place] [address]  Phone: [phone]  Fax: [phone]  Follow Up Time: 1 week

## 2023-03-09 NOTE — BRIEF OPERATIVE NOTE - OPERATION/FINDINGS
As dictated in full operative report.  No qualified resident was available to assist at bedside. A PA was necessary to complete the procedure. A resident was present for observational/educational purposes only.

## 2023-03-09 NOTE — ASU DISCHARGE PLAN (ADULT/PEDIATRIC) - ASU DC SPECIAL INSTRUCTIONSFT
Please follow up with Dr. Mae in 1 week, please call to schedule your appointment. Please follow up with Dr. Mae in 1 week, please call to schedule your appointment.  Ice packs for 72 hours  Drink lots of water  Resume diet as usual   Take ketorolac for pain and may take oxycodone if needed Please follow up with Dr. Mae in 1 week, please call to schedule your appointment.  Ice packs for 72 hours  Drink lots of water  Resume diet as usual   Take ketorolac for pain and may take oxycodone if needed  Keep scrotal support in place until cleared by surgeon.     General Discharge Instructions:  Please resume all regular home medications unless specifically advised not to take a particular medication. Also, please take any new medications as prescribed.  Please get plenty of rest, continue to ambulate several times per day, and drink adequate amounts of fluids. Avoid lifting weights greater than 5-10 lbs until you follow-up with your surgeon, who will instruct you further regarding activity restrictions.  Avoid driving or operating heavy machinery while taking pain medications.  Please follow-up with your surgeon and Primary Care Provider (PCP) as advised.  Incision Care:  *Please call your doctor or nurse practitioner if you have increased pain, swelling, redness, or drainage from the incision site.  *Avoid swimming and baths until your follow-up appointment.  *You may shower, and wash surgical incisions with a mild soap and warm water. Gently pat the area dry.  *If you have staples, they will be removed at your follow-up appointment.  *If you have steri-strips, they will fall off on their own. Please remove any remaining strips 7-10 days after surgery.    Warning Signs:  Please call your doctor or nurse practitioner if you experience the following:  *You experience new chest pain, pressure, squeezing or tightness.  *New or worsening cough, shortness of breath, or wheeze.  *If you are vomiting and cannot keep down fluids or your medications.  *You are getting dehydrated due to continued vomiting, diarrhea, or other reasons. Signs of dehydration include dry mouth, rapid heartbeat, or feeling dizzy or faint when standing.  *You see blood or dark/black material when you vomit or have a bowel movement.  *You experience burning when you urinate, have blood in your urine, or experience a discharge.  *Your pain is not improving within 8-12 hours or is not gone within 24 hours. Call or return immediately if your pain is getting worse, changes location, or moves to your chest or back.  *You have shaking chills, or fever greater than 101.5 degrees Fahrenheit or 38 degrees Celsius.  *Any change in your symptoms, or any new symptoms that concern you.

## 2023-03-15 PROBLEM — Z87.438 PERSONAL HISTORY OF OTHER DISEASES OF MALE GENITAL ORGANS: Chronic | Status: ACTIVE | Noted: 2023-03-08

## 2023-03-15 PROBLEM — M10.9 GOUT, UNSPECIFIED: Chronic | Status: ACTIVE | Noted: 2023-03-08

## 2023-03-15 PROBLEM — I10 ESSENTIAL (PRIMARY) HYPERTENSION: Chronic | Status: ACTIVE | Noted: 2023-03-08

## 2023-03-15 PROBLEM — E78.5 HYPERLIPIDEMIA, UNSPECIFIED: Chronic | Status: ACTIVE | Noted: 2023-03-08

## 2023-03-17 NOTE — ASU PATIENT PROFILE, ADULT - ABLE TO REACH PT
Left voicemail instruction and time. Patient instructed to arrive at 11:30am. No solid food/dairy/candy/gum after midnight Sunday, water allowed before 10:30am Monday; Patient reminded to bring photo ID/insurance card; dress in comfortable clothes; no jewelries/contact lens/no valuable; no smoking/alcohol drinking/recreational drug use today; escort to come with photo ID; address and callback number was given. MD's office was made aware, spoke to Joycelyn/ana

## 2023-03-17 NOTE — ASU PATIENT PROFILE, ADULT - FALL HARM RISK - UNIVERSAL INTERVENTIONS
Bed in lowest position, wheels locked, appropriate side rails in place/Call bell, personal items and telephone in reach/Instruct patient to call for assistance before getting out of bed or chair/Non-slip footwear when patient is out of bed/[place] to call system/Physically safe environment - no spills, clutter or unnecessary equipment/Purposeful Proactive Rounding/Room/bathroom lighting operational, light cord in reach

## 2023-03-19 ENCOUNTER — TRANSCRIPTION ENCOUNTER (OUTPATIENT)
Age: 68
End: 2023-03-19

## 2023-03-20 ENCOUNTER — TRANSCRIPTION ENCOUNTER (OUTPATIENT)
Age: 68
End: 2023-03-20

## 2023-03-20 ENCOUNTER — APPOINTMENT (OUTPATIENT)
Dept: UROLOGY | Facility: AMBULATORY SURGERY CENTER | Age: 68
End: 2023-03-20

## 2023-03-20 ENCOUNTER — OUTPATIENT (OUTPATIENT)
Dept: OUTPATIENT SERVICES | Facility: HOSPITAL | Age: 68
LOS: 1 days | Discharge: HOME | End: 2023-03-20
Payer: MEDICARE

## 2023-03-20 VITALS
OXYGEN SATURATION: 97 % | DIASTOLIC BLOOD PRESSURE: 84 MMHG | RESPIRATION RATE: 14 BRPM | WEIGHT: 167.77 LBS | HEIGHT: 71 IN | HEART RATE: 58 BPM | SYSTOLIC BLOOD PRESSURE: 126 MMHG | TEMPERATURE: 99 F

## 2023-03-20 VITALS
RESPIRATION RATE: 16 BRPM | TEMPERATURE: 98 F | DIASTOLIC BLOOD PRESSURE: 86 MMHG | OXYGEN SATURATION: 100 % | HEART RATE: 58 BPM | SYSTOLIC BLOOD PRESSURE: 151 MMHG

## 2023-03-20 PROCEDURE — 53854 TRURL DSTRJ PRST8 TISS RF WV: CPT

## 2023-03-20 DEVICE — GUIDEWIRE SENSOR DUAL-FLEX NITINOL STRAIGHT .035" X 150CM: Type: IMPLANTABLE DEVICE | Status: FUNCTIONAL

## 2023-03-20 DEVICE — SYS DELIVERY REZUM DEVICE: Type: IMPLANTABLE DEVICE | Status: FUNCTIONAL

## 2023-03-20 RX ORDER — ACETAMINOPHEN 500 MG
1000 TABLET ORAL ONCE
Refills: 0 | Status: COMPLETED | OUTPATIENT
Start: 2023-03-20 | End: 2023-03-20

## 2023-03-20 RX ORDER — AMLODIPINE BESYLATE 2.5 MG/1
1 TABLET ORAL
Qty: 0 | Refills: 0 | DISCHARGE

## 2023-03-20 RX ORDER — ATORVASTATIN CALCIUM 80 MG/1
1 TABLET, FILM COATED ORAL
Qty: 0 | Refills: 0 | DISCHARGE

## 2023-03-20 RX ORDER — LIDOCAINE HCL 20 MG/ML
5 VIAL (ML) INJECTION ONCE
Refills: 0 | Status: COMPLETED | OUTPATIENT
Start: 2023-03-20 | End: 2023-03-20

## 2023-03-20 RX ORDER — OXYBUTYNIN CHLORIDE 5 MG
5 TABLET ORAL ONCE
Refills: 0 | Status: COMPLETED | OUTPATIENT
Start: 2023-03-20 | End: 2023-03-20

## 2023-03-20 RX ORDER — TAMSULOSIN HYDROCHLORIDE 0.4 MG/1
1 CAPSULE ORAL
Qty: 0 | Refills: 0 | DISCHARGE

## 2023-03-20 RX ORDER — ALLOPURINOL 300 MG
1 TABLET ORAL
Qty: 0 | Refills: 0 | DISCHARGE

## 2023-03-20 RX ORDER — HYDRALAZINE HCL 50 MG
5 TABLET ORAL ONCE
Refills: 0 | Status: COMPLETED | OUTPATIENT
Start: 2023-03-20 | End: 2023-03-20

## 2023-03-20 RX ORDER — ONDANSETRON 8 MG/1
4 TABLET, FILM COATED ORAL ONCE
Refills: 0 | Status: DISCONTINUED | OUTPATIENT
Start: 2023-03-20 | End: 2023-03-20

## 2023-03-20 RX ORDER — ACETAMINOPHEN 500 MG
1000 TABLET ORAL ONCE
Refills: 0 | Status: DISCONTINUED | OUTPATIENT
Start: 2023-03-20 | End: 2023-03-20

## 2023-03-20 RX ORDER — HYDRALAZINE HCL 50 MG
5 TABLET ORAL ONCE
Refills: 0 | Status: DISCONTINUED | OUTPATIENT
Start: 2023-03-20 | End: 2023-03-20

## 2023-03-20 RX ORDER — ATENOLOL 25 MG/1
1 TABLET ORAL
Qty: 0 | Refills: 0 | DISCHARGE

## 2023-03-20 RX ORDER — PHENAZOPYRIDINE HCL 100 MG
100 TABLET ORAL ONCE
Refills: 0 | Status: COMPLETED | OUTPATIENT
Start: 2023-03-20 | End: 2023-03-20

## 2023-03-20 RX ORDER — SODIUM CHLORIDE 9 MG/ML
1000 INJECTION, SOLUTION INTRAVENOUS
Refills: 0 | Status: DISCONTINUED | OUTPATIENT
Start: 2023-03-20 | End: 2023-03-20

## 2023-03-20 RX ORDER — FENTANYL CITRATE 50 UG/ML
25 INJECTION INTRAVENOUS
Refills: 0 | Status: DISCONTINUED | OUTPATIENT
Start: 2023-03-20 | End: 2023-03-20

## 2023-03-20 RX ADMIN — FENTANYL CITRATE 25 MICROGRAM(S): 50 INJECTION INTRAVENOUS at 16:03

## 2023-03-20 RX ADMIN — FENTANYL CITRATE 25 MICROGRAM(S): 50 INJECTION INTRAVENOUS at 16:30

## 2023-03-20 RX ADMIN — Medication 100 MILLIGRAM(S): at 16:17

## 2023-03-20 RX ADMIN — Medication 5 MILLILITER(S): at 16:21

## 2023-03-20 RX ADMIN — SODIUM CHLORIDE 100 MILLILITER(S): 9 INJECTION, SOLUTION INTRAVENOUS at 16:05

## 2023-03-20 RX ADMIN — Medication 400 MILLIGRAM(S): at 18:05

## 2023-03-20 RX ADMIN — Medication 5 MILLIGRAM(S): at 16:16

## 2023-03-20 RX ADMIN — FENTANYL CITRATE 25 MICROGRAM(S): 50 INJECTION INTRAVENOUS at 16:15

## 2023-03-20 RX ADMIN — Medication 5 MILLIGRAM(S): at 16:37

## 2023-03-20 RX ADMIN — Medication 1000 MILLIGRAM(S): at 18:20

## 2023-03-20 RX ADMIN — FENTANYL CITRATE 25 MICROGRAM(S): 50 INJECTION INTRAVENOUS at 16:14

## 2023-03-20 NOTE — PRE-ANESTHESIA EVALUATION ADULT - NSANTHGENDERRD_ENT_A_CORE
Virgie Goss Yes

## 2023-03-20 NOTE — PROVIDER CONTACT NOTE (OTHER) - ASSESSMENT
Hydralazine administered for HTN, resolved shortly but now /104. Pt denies chest pain. Pt denies pain.

## 2023-03-20 NOTE — ASU DISCHARGE PLAN (ADULT/PEDIATRIC) - ASU DC SPECIAL INSTRUCTIONSFT
GENERAL: Expect blood in the urine, stool, and ejaculate for up to two (2) months postoperatively. This is normal and to be expected after this procedure. Increase hydration to keep the urine as clear as possible.    CATHETER: Some patients are sent home with a Thompson catheter, while others go home urinating on their own. A Thompson catheter continuously drains the urine from the bladder. If you still have a catheter, the nurses will review instructions and care before you go home.     PAIN: You may have some intermittent pain for up to six (6) weeks post operatively. Pain does not signify any problem unless associated with fever, chills, or inability to void.  If you experience any fevers or chills please call immediately as this may be signs of an infection. You may take Tylenol (acetaminophen) 650-975mg and/or Motrin (ibuprofen) 400-600mg, both available over the counter, for pain every 6 hours as needed. Do not exceed 4000mg of Tylenol (acetaminophen) daily. You may alternate these medications such that you take one or the other every 3 hours for around the clock pain coverage.     ANTICOAGULATION: If you are taking any blood thinning medications, please discuss with your urologist prior to restarting these medications unless otherwise specified.    BATHING: You may shower with soap and water, and pat dry the needle insertion sites in the perineum. Avoid sitting in water for prolonged periods of time for the next few days.    DIET: You may resume your regular diet and regular medication regimen.    ACTIVITY: No heavy lifting or strenuous exercise until you are evaluated at your post-operative appointment. Otherwise, you may return to your usual level of physical activity.    FOLLOW-UP: If you did not already schedule your post-operative appointment, please call your urologist to schedule and follow-up appointment.    CALL YOUR UROLOGIST IF: You have any bleeding that does not stop, inability to void >8 hours, fever over 100.4 F, chills, persistent nausea/vomiting, changes in your incision concerning for infection, or if your pain is not controlled on your discharge pain medications.

## 2023-03-20 NOTE — PROVIDER CONTACT NOTE (OTHER) - RECOMMENDATIONS
Per Dr. Arango, will order another dose of hydralazine for patient. RYLEY Lopez endorses information to RYLEY Davies

## 2023-03-20 NOTE — ASU DISCHARGE PLAN (ADULT/PEDIATRIC) - CARE PROVIDER_API CALL
Sofiya Meneses)  Urology  [address] [place] [address]  Phone: [phone]  Fax: [phone]  Follow Up Time: 1-3 days

## 2023-03-21 ENCOUNTER — NON-APPOINTMENT (OUTPATIENT)
Age: 68
End: 2023-03-21

## 2023-03-22 ENCOUNTER — APPOINTMENT (OUTPATIENT)
Dept: UROLOGY | Facility: CLINIC | Age: 68
End: 2023-03-22
Payer: MEDICARE

## 2023-03-22 VITALS
OXYGEN SATURATION: 97 % | TEMPERATURE: 97.9 F | HEART RATE: 65 BPM | DIASTOLIC BLOOD PRESSURE: 94 MMHG | SYSTOLIC BLOOD PRESSURE: 147 MMHG

## 2023-03-22 PROCEDURE — 99024 POSTOP FOLLOW-UP VISIT: CPT

## 2023-03-22 PROCEDURE — 51798 US URINE CAPACITY MEASURE: CPT

## 2023-03-22 NOTE — HISTORY OF PRESENT ILLNESS
[FreeTextEntry1] : 67 yr old male, referred by Dr. Raza, to discuss prostate debulking procedures. He has had bothersome LUTS for many years. He is currently on alfuzosin. He urinates with urgency, frequency, slow intermittent stream, post-void dribbling, nocturia x3-4. No history of gross hematuria, hx retention, bladder stones, frequent UTIs. \par \par He has had UDS in the past which showed normal bladder function with CARRASCO. \par \par Of note, he will be having hernia surgery in the near future. \par \par Bladder US: prostate 42 grams \par \par PSAs: 11/15/2021--1.57; 11/22/22--1.5; \par \par IPSS: 29, QOL: 4 \par UA: neg\par PVR: 51 ml \par  \par \par 3/20/23: Rezum, procedure uncomplicated\par \par 3/22/23: Presents for void trial. Catheter removed, patient able to void 200 cc, PVR 15 cc.

## 2023-03-22 NOTE — ASSESSMENT
[FreeTextEntry1] : 67 yr old male with BPH and bothersome LUTS on alfuzosin s/p Rezum 3/20/23. Presents today for void trial, which was successful.\par  - F/U in 6 weeks for UA, IPSS, PVR\par

## 2023-03-22 NOTE — PHYSICAL EXAM
[General Appearance - Well Developed] : well developed [General Appearance - Well Nourished] : well nourished [Normal Appearance] : normal appearance [Well Groomed] : well groomed [General Appearance - In No Acute Distress] : no acute distress [Abdomen Soft] : soft [Abdomen Tenderness] : non-tender [Costovertebral Angle Tenderness] : no ~M costovertebral angle tenderness [Urethral Meatus] : meatus normal [Urinary Bladder Findings] : the bladder was normal on palpation [Scrotum] : the scrotum was normal [Testes Mass (___cm)] : there were no testicular masses [Edema] : no peripheral edema [] : no respiratory distress [Respiration, Rhythm And Depth] : normal respiratory rhythm and effort [Exaggerated Use Of Accessory Muscles For Inspiration] : no accessory muscle use [Oriented To Time, Place, And Person] : oriented to person, place, and time [Affect] : the affect was normal [Mood] : the mood was normal [Not Anxious] : not anxious [Normal Station and Gait] : the gait and station were normal for the patient's age [No Focal Deficits] : no focal deficits [FreeTextEntry1] : Catheter in place with clear urine

## 2023-04-03 ENCOUNTER — NON-APPOINTMENT (OUTPATIENT)
Age: 68
End: 2023-04-03

## 2023-05-10 ENCOUNTER — APPOINTMENT (OUTPATIENT)
Dept: UROLOGY | Facility: CLINIC | Age: 68
End: 2023-05-10

## 2023-05-10 ENCOUNTER — NON-APPOINTMENT (OUTPATIENT)
Age: 68
End: 2023-05-10

## 2023-05-11 ENCOUNTER — RX RENEWAL (OUTPATIENT)
Age: 68
End: 2023-05-11

## 2023-05-11 RX ORDER — ALFUZOSIN HYDROCHLORIDE 10 MG/1
10 TABLET, EXTENDED RELEASE ORAL
Qty: 90 | Refills: 0 | Status: ACTIVE | COMMUNITY
Start: 2022-05-24 | End: 1900-01-01

## 2023-05-12 ENCOUNTER — APPOINTMENT (OUTPATIENT)
Dept: UROLOGY | Facility: CLINIC | Age: 68
End: 2023-05-12
Payer: MEDICARE

## 2023-05-12 PROCEDURE — 99441: CPT

## 2023-05-12 NOTE — ASSESSMENT
[FreeTextEntry1] : 67 yr old male with BPH and bothersome LUTS on alfuzosin s/p Rezum 3/20/23. Doign well with stronger stream, decreased frequency, urgency and nocturia.\par - He will trial stopping alfuzosin\par  - F/u prn

## 2023-05-12 NOTE — HISTORY OF PRESENT ILLNESS
[Home] : at home, [unfilled] , at the time of the visit. [Medical Office: ([facility])___] : at the medical office located in  [Verbal consent obtained from patient] : the patient, [unfilled] [FreeTextEntry1] : 67 yr old male, referred by Dr. Raza, to discuss prostate debulking procedures. He has had bothersome LUTS for many years. He is currently on alfuzosin. He urinates with urgency, frequency, slow intermittent stream, post-void dribbling, nocturia x3-4. No history of gross hematuria, hx retention, bladder stones, frequent UTIs. \par \par He has had UDS in the past which showed normal bladder function with CARRASCO. \par \par Of note, he will be having hernia surgery in the near future. \par \par Bladder US: prostate 42 grams \par \par PSAs: 11/15/2021--1.57; 11/22/22--1.5; \par \par IPSS: 29, QOL: 4 \par UA: neg\par PVR: 51 ml \par  \par \par 3/20/23: Rezum, procedure uncomplicated\par \par 3/22/23: Presents for void trial. Catheter removed, patient able to void 200 cc, PVR 15 cc.\par \par 5/12/23: Doing well. Voiding with stronger stream, more complete emptying, less frequency and urgency. Initially had hematuria and dysuria that has resolved.\par \par

## 2023-06-06 ENCOUNTER — APPOINTMENT (OUTPATIENT)
Dept: UROLOGY | Facility: CLINIC | Age: 68
End: 2023-06-06
Payer: COMMERCIAL

## 2023-06-06 DIAGNOSIS — N13.8 BENIGN PROSTATIC HYPERPLASIA WITH LOWER URINARY TRACT SYMPMS: ICD-10-CM

## 2023-06-06 DIAGNOSIS — N40.1 BENIGN PROSTATIC HYPERPLASIA WITH LOWER URINARY TRACT SYMPMS: ICD-10-CM

## 2023-06-06 DIAGNOSIS — R39.198 OTHER DIFFICULTIES WITH MICTURITION: ICD-10-CM

## 2023-06-06 DIAGNOSIS — R39.15 URGENCY OF URINATION: ICD-10-CM

## 2023-06-06 DIAGNOSIS — R35.0 FREQUENCY OF MICTURITION: ICD-10-CM

## 2023-06-06 PROCEDURE — 99441: CPT

## 2023-06-06 NOTE — HISTORY OF PRESENT ILLNESS
[Home] : at home, [unfilled] , at the time of the visit. [Medical Office: ([facility])___] : at the medical office located in  [Verbal consent obtained from patient] : the patient, [unfilled] [FreeTextEntry1] : 67 yr old male, referred by Dr. Raza, to discuss prostate debulking procedures. He has had bothersome LUTS for many years. He is currently on alfuzosin. He urinates with urgency, frequency, slow intermittent stream, post-void dribbling, nocturia x3-4. No history of gross hematuria, hx retention, bladder stones, frequent UTIs. \par \par He has had UDS in the past which showed normal bladder function with CARRASCO. \par \par Of note, he will be having hernia surgery in the near future. \par \par Bladder US: prostate 42 grams \par \par PSAs: 11/15/2021--1.57; 11/22/22--1.5; \par \par IPSS: 29, QOL: 4 \par UA: neg\par PVR: 51 ml \par  \par \par 3/20/23: Rezum, procedure uncomplicated\par \par 3/22/23: Presents for void trial. Catheter removed, patient able to void 200 cc, PVR 15 cc.\par \par 5/12/23: Doing well. Voiding with stronger stream, more complete emptying, less frequency and urgency. Initially had hematuria and dysuria that has resolved.\par \par 6/6/23: feels there has been a change in urination. Increased urgency, weaker stream. Still taking alfuzosin. No hematuria or dysuria. No increase in nocturia. \par \par \par \par

## 2023-06-06 NOTE — ASSESSMENT
[FreeTextEntry1] : 67 yr old male with BPH and bothersome LUTS on alfuzosin s/p Rezum 3/20/23. Initially doing well with stronger stream, decreased frequency, urgency and nocturia. Now with weaker stream and increased urgency. Discussed possibility of recurrent BPH, stricture or infection. Patient will obtain UA and urine culture, but would like to monitor symptoms for now. \par  - UA, UCx\par  - Recommend uroflow, PVR. Patient would like to monitor for now\par  - Continue alfuzosin\par

## 2023-11-29 ENCOUNTER — NON-APPOINTMENT (OUTPATIENT)
Age: 68
End: 2023-11-29
